# Patient Record
Sex: FEMALE | Race: BLACK OR AFRICAN AMERICAN | NOT HISPANIC OR LATINO | URBAN - METROPOLITAN AREA
[De-identification: names, ages, dates, MRNs, and addresses within clinical notes are randomized per-mention and may not be internally consistent; named-entity substitution may affect disease eponyms.]

---

## 2022-07-04 ENCOUNTER — EMERGENCY (EMERGENCY)
Facility: HOSPITAL | Age: 27
LOS: 1 days | Discharge: HOME | End: 2022-07-04
Admitting: EMERGENCY MEDICINE
Payer: COMMERCIAL

## 2022-07-04 VITALS
DIASTOLIC BLOOD PRESSURE: 78 MMHG | OXYGEN SATURATION: 98 % | RESPIRATION RATE: 18 BRPM | SYSTOLIC BLOOD PRESSURE: 112 MMHG | TEMPERATURE: 98 F | HEART RATE: 63 BPM

## 2022-07-04 PROCEDURE — 99282 EMERGENCY DEPT VISIT SF MDM: CPT

## 2022-07-04 RX ORDER — DIPHENHYDRAMINE HCL 50 MG
25 CAPSULE ORAL ONCE
Refills: 0 | Status: COMPLETED | OUTPATIENT
Start: 2022-07-04 | End: 2022-07-04

## 2022-07-04 RX ADMIN — Medication 25 MILLIGRAM(S): at 00:35

## 2022-07-04 NOTE — ED PROVIDER NOTE - PHYSICAL EXAMINATION
Constitutional : Well appearing, awake, alert, oriented to person, place, time/situation and in no apparent distress.  HENMT : head normocephalic, atraumatic. airway patent. moist mucous membranes. no rash / lesions / welts in or surrounding mouth and oropharynx.  Eyes : clear bilaterally, PERRL, EOMI  Cardiac : Extremities warm and well perfused. 2+ radial and DP pulses. cap refill <2 seconds, no LE edema.  Resp : Respirations even and unlabored.   MSK :  range of motion is not limited, no muscle or joint tenderness  Neuro : Alert and oriented, no focal deficits, no motor or sensory deficits.  Psych : Alert and oriented to person, place, time/situation. normal mood and affect. no apparent risk to self or others.     Skin : RUE, chest, back and right upper hip with multiple red, raised, areas. all similar in size and appearance. each approx 0.5x0.5cm in size. LUE with few scattered bites. no open areas, no blistering, no drainage. no surrounding or streaking redness. no edema.   otherwise - Skin normal color for race, warm, dry and intact. No evidence of rash.

## 2022-07-04 NOTE — ED PROVIDER NOTE - OBJECTIVE STATEMENT
26 yr old female, otherwise healthy, presents to the Emergency Department with insect bites. Pt was in [place] last night, was at a pool party and stayed in a hotel overnight. 26 yr old female, otherwise healthy, presents to the Emergency Department with insect bites. Pt was in [place] last night, was at a pool party and stayed in a hotel overnight. Woke today with multiple raised , red areas to right upper arm. Throughout day noticed they were also spread over chest, back and right upper hip also on left forearm. Areas itch, has not taken medications.   Was with boyfriend yesterday who does not have similar but pt did sit on fabric chair at pool party that he did not take.   No fever, n/v. No lesions in mouth, palms, soles of feet. No rash to genital region. No blistering or drainage.   No new soaps, lotions, detergents that pt is aware of.  No difficulty breathing, throat itching, throat swelling, abd pain, diarrhea.

## 2022-07-04 NOTE — ED PROVIDER NOTE - PATIENT PORTAL LINK FT
You can access the FollowMyHealth Patient Portal offered by [facility] by registering at the following website: [email or web address]. By joining [facility]’s FollowMyHealth portal, you will also be able to view your health information using other applications (apps) compatible with our system.

## 2022-07-04 NOTE — ED ADULT NURSE NOTE - OBJECTIVE STATEMENT
Received ambulatory with steady gait with chief complaints of possible insect bites mostly to R upper arm, some in R leg noticed upon waking up today. Patient states staying in a hotel yesterday with boyfriend however boyfriend without any insect bite/s. Denies fever, chills. Patient states "my body is burning because its itchy."     Patient AOX4, speaking full sentences, SO @  bedside.  +PERRLA.  Patient denies chest pain, shortness of breath, difficulty breathing and any form of distress not noted.  Resps even and nonlabored. Moves all extremities. Patient oriented to ED area. All needs attended. POC reviewed. Purposeful proactive hourly rounding in progress.

## 2022-07-04 NOTE — ED PROVIDER NOTE - CLINICAL SUMMARY MEDICAL DECISION MAKING FREE TEXT BOX
pt w multiple insect bites pt w multiple raised / red areas that seem consistent with insect bites primarily to RUE / chest / back.  areas seem most consistent w insect bites - possible bed bugs given appearance and distribution in clusters.   not consistent w hives / urticaria and no other symptoms to suggest allergic reaction / anaphylaxis.   no open or blistering lesions.   ultimately no s/sx consistent with emergent dermatologic process.   will give benadryl for itching while in ED.  will dc w supportive care and dermatology information for follow up prn. given information regarding care w bed bugs.  pt comfortable and agreeable with plan.

## 2022-07-04 NOTE — ED PROVIDER NOTE - NSFOLLOWUPINSTRUCTIONS_ED_ALL_ED_FT
Bedbugs       Bedbugs are tiny bugs that live in and around beds. They stay hidden during the day, and they come out at night and bite. Bedbugs need blood to live and grow.      Where are bedbugs found?    Bedbugs can be found anywhere, whether a place is clean or dirty. They are found in places where many people come and go, such as hotels, shelters, dorms, and health care settings. It is also common for them to be found in homes where there are many birds or bats nearby.      What are bedbug bites like?     A bedbug bite leaves a small red bump with a darker red dot in the middle. The bump may appear soon after a person is bitten or one or more days later. Bedbug bites usually do not hurt, but they may itch.    Most people do not need treatment for bedbug bites. The bumps usually go away on their own in a few days.    If you have a lot of bedbug bites and they feel very itchy:  •Do not scratch the bite areas.    •You may apply any of these to the bite area as told by your health care provider:  •A baking soda paste. Make this by adding water to baking soda.      •Cortisone cream.      •Calamine lotion.          How do I check for bedbugs?    Adult bedbugs are reddish-brown, oval, and flat. They are about as long as a grain of rice (¼ inch or 5–7 mm), and they cannot fly. Young bedbugs (nymphs) are smaller, and they are whitish-yellow or clear (translucent).    Using a flashlight, look for bedbugs in these places:  •On mattresses, bed frames, headboards, and box springs.      •On drapes and curtains in bedrooms.      •Under carpeting in bedrooms.      •Behind electrical outlets.      •Behind any wallpaper that is peeling.      •Inside luggage.      Also look for black or red spots or stains on or near the bed. Stains can come from bedbugs that have been crushed or from bedbug waste.      What should I do if I find bedbugs?    When traveling     If you find bedbugs while traveling, check all of your possessions carefully before you bring them into your home. Consider throwing away anything that has bedbugs on it.    At home     If you find bedbugs at home, your bedroom may need to be treated by a pest control expert. You may also need to throw away mattresses or luggage. To help prevent bedbugs from coming back, consider taking these actions:  •Wash your clothes and bedding in water that is hotter than 120°F (48.9°C) and dry them on a hot setting. Bedbugs are killed by high temperatures.      •Put a plastic cover over your mattress.      •When sleeping, wear pajamas that have long sleeves and pant legs. Bedbugs usually bite areas of the skin that are not covered.      •Vacuum often around the bed and in all of the cracks and crevices where the bugs might hide.      •Carefully check all used furniture, bedding, or clothes that you bring into your home.      •Eliminate bird nests and bat roosts that are near your home.        Where to find more information    •[place] Environmental Protection Agency (EPA): www.epa.gov/bedbugs        Summary    •Bedbugs are tiny bugs that live in and around beds.      •Bedbugs are most often found in places where many people come and go, such as hotels, shelters, dorms, and health care settings.      •A bedbug bite leaves a small red bump with a darker red dot in the middle.      •Bedbug bites usually do not hurt, but they may itch.      •If you find bedbugs at home, your bedroom may need to be treated by a pest control expert.      This information is not intended to replace advice given to you by your health care provider. Make sure you discuss any questions you have with your health care provider.

## 2022-07-04 NOTE — ED ADULT TRIAGE NOTE - ARRIVAL INFO ADDITIONAL COMMENTS
pt woke this am with 2 tiny bites on her right arm and when she went to take a shower tonight she noticed the same bites all over her body..   + itching.    denies seeing any bugs.

## 2022-07-07 DIAGNOSIS — S20.96XA INSECT BITE (NONVENOMOUS) OF UNSPECIFIED PARTS OF THORAX, INITIAL ENCOUNTER: ICD-10-CM
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
## 2022-07-07 DIAGNOSIS — S20.469A INSECT BITE (NONVENOMOUS) OF UNSPECIFIED BACK WALL OF THORAX, INITIAL ENCOUNTER: ICD-10-CM

## 2022-07-07 DIAGNOSIS — S70.261A INSECT BITE (NONVENOMOUS), RIGHT HIP, INITIAL ENCOUNTER: ICD-10-CM
